# Patient Record
(demographics unavailable — no encounter records)

---

## 2017-10-03 NOTE — NUR
Report given to emt's for katia. VSS. Patient is alert and responsive.  No 
further complaints. IV removed. Catheter intact and site benign. Pressure and 
4x4 applied to site. No bleeding noted.

## 2017-10-03 NOTE — NUR
TO BED 8 A 52 YO FEMALE PT SALONI FROM [facility] FOR TACHYCARDIA 
130. PER FACILITY GAVE 650MG TYLENOL AT 4:30PM TODAY. UPON ARRIVAL, PATIENT IS 
LETHARGIC, NSR ON THE TELE AT 90'S. PLACED ON CARDIAC AND VS MONITORING. WITH 
02 CANNULA AT 2LPM VIA, SATTING AT % AT THIS TIME. KEPT HOB ELEVATED. 
SAFETY AND COMFORT MEASURES RENDERED.

## 2017-12-06 NOTE — NUR
SALONI FROM [facility] DUE TO EPISODE OF SEIZURE. PATIENT RECEIVED 
AWAKE, APPEARS IN NO APPARENT DISTRESS. RESPIRATION EVEN AND UNLABORED. PATIENT 
SATING WELL. PATIENT WITH HX OF SEIZURE. CONNECTED TO TELE MONITOR. NOTED 
TACHY. OTHER VSS

## 2018-01-03 NOTE — NUR
MS RN INITIAL NOTES 



PT IS IN BED AWAKE AND ALERT, ORIENTED TO SELF ONLY. NO SIGNS OF SOB OR DISTRESS, BREATHING 
EVENLY AND UNLABORED. IV ACCESS IS INTACT AND PATENT. BED IS IN LOW AND LOCKED POSITION, 
SIDE RAILS PADDED FOR SEIZURE PRECAUTIONS. WILL CONTINUE TO MONITOR PT

## 2018-01-03 NOTE — NUR
RN NOTE



HELD PROTONIX PT NOT FULLY ALERT AND INCREASE CHANCE OF ASPIRATION. WILL ENDORSE TO AM RN.

## 2018-01-03 NOTE — NUR
RN NOTE



PT REMAINS IN NO ACUTE DISTRESS IN BED. PT DID NOT HAVE ANY SIGNIFICANT CHANGE IN CONDITION 
DURING SHIFT. ALL NEEDS MET, ALL ORDERS CARRIED OUT. WILL ENDORSE CARE TO AM RN FOR CONTINUE 
OF CARE.

## 2018-01-03 NOTE — NUR
RN NOTE



RECEIVED PT IN NO ACUTE DISTRESS IN BED. PT IS OBTUNDED AND NOT AROUSABLE. PT RESPONDS TO 
PAINFUL STIMULI. PT IS ON TELE WITH SR ON THE MONITOR. PT NOT C/O ANY SOB, DIFFICULTY 
BREATHING OR PAIN AT THIS TIME. PT HAS L WRIST 18G THAT IS CLEAN DRY INTACT AND PATENT WITH 
SALINE FLUSH. PT HAS RIGHT AKA. BED IN LOW LOCK POSITION WITH RIALS UP X2 .CALL LIGHT WITHIN 
REACH AND ALL SAFETY MEASURES ENSURED AND CARRIED OUT WILL CONTINUE TO  MONITOR PT.

## 2018-01-03 NOTE — NUR
PT RECEIVED FROM RA FROM SNF C/O SEIZURE LIKE ACTIVITY X1 HOUR. NO SOB NOTED AT 
THIS TIME BUT 02 PLACED FOR PRECAUTION.NO PAIN NOTED. PT IS OBTUNDED UNABLE TO 
MAKE NEEDS KNOWN. WILL CONTINUE CARE

## 2018-01-03 NOTE — NUR
RN CLOSED NOTES



PATIENT IS IN BED, AWAKE. ALERT TO SELF ONLY. NO SIGNS AND SYMPTOMS OF DISTRESS. BREATHING 
IS BILATERALLY EVEN AND UNLABORED. BED IN LOW POSITION, LOCKED AND TWO SIDE RAILS ARE UP. 
CALL LIGHT WITHIN REACH FOR SAFETY. ALL NURSING CARE ANTICIPATED AND ATTENDED FOR. PATIENT 
KEPT CLEAN AND DRY. WILL ENDORSE TO NIGHT SHIFT NURSE

## 2018-01-03 NOTE — NUR
RN NOTE



PT BECAME MORE ALERT AND ABLE TO ANSWER QUESTION AND FOLLOW COMMANDS. WILL CONTINUE TO 
MONITOR PT.

## 2018-01-04 NOTE — NUR
TELE RN CLOSING NOTES 



PT IS IN BED RESTING. NO SIGNS OF SOB OR DISTRESS, BREATHING EVENLY AND UNLABORED. TELE 
MONITOR SHOWING SR 62. SEIZURE PRECAUTIONS MAINTAINED THROUGHOUT THE SHIFT. BED IS IN LOW 
AND LOCKED POSITION. WILL ENDORSE TO DAYSHIFT

## 2018-01-04 NOTE — NUR
MS RN INITIAL NOTES 



PT IS IN BED AWAKE AND ALERT, ORIENTED TO SELF ONLY. NO SIGNS OF SOB OR DISTRESS, BREATHING 
EVENLY AND UNLABORED. IV ACCESS IS INTACT AND PATENT. PT COMPLAINTS OF GENERALIZED PAIN, 
SCHEDULED PAIN MED TO BE GIVEN. BED IS IN LOW AND LOCKED POSITION, SIDE RAILS PADDED FOR 
SEIZURE PRECAUTIONS. WILL CONTINUE TO MONITOR PT.

## 2018-01-04 NOTE — NUR
RN OPEN NOTES



RECEIVED REPORT FROM NIGHT SHIFT RN. PATIENT IS IN BED, ALERT AND ORIENTED TO SELF. NO S/S 
OF DISTRESS. BED IN LOW POSITION, LOCKED AND TWO SIDE RAILS ARE UP. CALL LIGHT WITHIN REACH 
FOR SAFETY. WILL CONTINUE TO ASSESS AND MONITOR PATIENT

## 2018-01-05 NOTE — NUR
RN OPEN NOTES



RECEIVED REPORT FROM NIGHT SHIFT NURSE. PATIENT IS IN BED, ALERT AND ORIENTED TO SELF, 
CONFUSED. NO SIGNS AND SYMPTOMS OF DISTRESS. BED ALARM IS ON FOR SAFETY. BED IN LOW 
POSITION, LOCKED AND TWO SIDE RAILS ARE UP. CALL LIGHT WITHIN REACH. WILL CONTINUE TO ASSESS 
AND MONITOR PATIENT.

## 2018-01-05 NOTE — NUR
MS/RN NOTES



RECEIVED PT. LYING IN BED. AWAKE, ALERT AND ORIENTED TO SELF. BREATHING EVEN AND UNLABORED 
ON 2LPM O2 VIA NC. NO SOB, RESPIRATORY DISTRESS OR COMPLAINTS OF PAIN NOTED AT THIS TIME. 
PT. WITH LEFT WRIST 18 GAUGE IV SALINE LOCK PRESENT, PATENT AND INTACT. SEIZURE PRECAUTIONS 
IMPLEMENTED AND IN PLACE. BED LOCKED AND IN LOWEST POSITION, SIDE RAILS UP X3, BED ALARM ON, 
CALL LIGHT WITHIN REACH, WILL CONTINUE TO MONITOR.

## 2018-01-05 NOTE — NUR
RN CLOSED NOTES



PATIENT IS IN BED, AWAKE AND ALERT TO SELF ONLY. NO SIGNS AND SYMPTOMS OF DISTRESS. 
BREATHING IS BILATERALLY EVEN AND UNLABORED. BED IN LOW POSITION, LOCKED AND TWO SIDE RAILS 
ARE UP. CALL LIGHT WITHIN REACH FOR SAFETY. ALL NURSING CARE ANTICIPATED AND ATTENDED FOR. 
PATIENT KEPT CLEAN AND DRY. TURNED Q2HR. WILL ENDORSE TO NIGHT SHIFT NURSE

## 2018-01-05 NOTE — NUR
MS RN CLOSING NOTES 



PT IS IN BED RESTING. NO SIGNS OF SOB OR DISTRESS, BREATHING EVENLY AND UNLABORED. ISOLATION 
FOR MRSA NARES. SEIZURE PRECAUTIONS MAINTAINED THROUGHOUT THE SHIFT. BED IS IN LOW AND 
LOCKED POSITION. WILL ENDORSE TO DAYSHIFT

## 2018-01-06 NOTE — NUR
RN NOTES

PT IS LAYING DOWN IN BED, RESTING COMFORTABLY. PT ON 2L O2, RESPIRATIONS ARE EVEN AND 
UNLABORED. WAITING FOR AMBULANCE  TO BE TAKEN TO [facility]. SAFETY MEASURES ARE IN PLACE, CALL LIGHT IS IN REACH. WILL ENDORSE TO NIGHT SHIFT RN 
FOR DISCHARGE.

## 2018-01-06 NOTE — NUR
RN OPENING NOTES

RECEIVED PATIENT IN BED, ALERT AND ORIENTED X 2, ABLE TO MAKE NEEDS KNOWN, NOTED WITH NO 
SOB, BREATHING EVEN AND UNLABORED, NOTED WITH NO C/O PAIN, IN NO ACUTE DISTRESS. ALL 
PATIENT'S NEEDS ATTENDED TO. PATIENT IS READY TO BE DISCHARGED, AWAITING FOR PICK-UP. WILL 
CONTINUE TO MONITOR.

## 2018-02-20 NOTE — NUR
PT TYLER MUNGUIA FROM [facility] FOR WITNESSED SEIZURE AT SNF. PT NOTED 
ACTIVELY SEIZING UPON ARRIVAL. ATIVAN GIVEN AS ORDERED. SEIZURE PRECAUTIONS 
IMPLEMENTED. NO ORAL TRAUMA NOTED. PT MAS, NOTED WITH R LASHONDA. IV ACCESS STARTED. 
MD AT BS FOR EVAL. VSS. SAFETY AND COMFORT MEASURES PROVIDED. WILL MONITOR.

## 2018-02-21 NOTE — NUR
RN NOTE:



MILTON JUAREZ WAS MADE AWARE ABOUT THE PATIENT'S ELEVATED HR 'S - 140'S. PATIENT IS 
RESTING COMFORTABLY IN BED AND ASLEEP, BUT AROUSABLE WHEN CALLING HER NAME. NP W/ EKG ORDER, 
NOTED AND CARRIED OUT.

## 2018-02-21 NOTE — NUR
RN opening NOtes:



Received patient on bed awake and alert, on room air, not in apparent distress. Sinus tach 
on monitor HR at 140's to 150's, MD previously aware.  IV access on left hand, patent and 
intact, IVF infusing as ordered. with pain rated as 10/10, "all over my body". pain meds not 
yet due at this time. safety measures ensured. call light in reach. continuously monitored.

## 2018-02-21 NOTE — NUR
RN TELE NOTE:



PATIENT IN BED, HOB ELEVATED. ON O2 2L/MIN VIA NC SATURATING 95%. ON CARDIAC MONITOR ZR=[id number]. 
PATIENT DENIED PAIN. RECEIVED MORPHINE 2MG IV FOR GENERALIZED PAIN, AND WAS RELIEVED BY PAIN 
MEDICATION. 2 SIDE RAILS UP AND PADDED FOR SAFETY. NO SEIZURE EPISODE WITHIN THE SHIFT. 
MILTON JUAREZ WAS AWARE OF THE PATIENT'S HEART RHYTHM OF SINUS TACHYCARDIA PRE AND POST IV 
BOLUS OF 1L NS. (L) HAND IV PERIPHERAL LINE NOTED PATENT AND INTACT. BED ALARM AND LOCKED AT 
ALL TIMES. CALL LIGHT WITHIN REACH. WILL REPORT TO PM SHIFT NURSE FOR CONTINUITY OF CARE.

## 2018-02-21 NOTE — NUR
RN YOSEF NOTE:



INFORMED LARRY MAC NP RE: PATIENT'S NPO STATUS AT THIS TIME. AND PT HAS ANTI-SEIZURE 
MEDICATIONS. PER NP, OK TO ADMINISTER ANTI-SEIZURE MEDICATIONS IF PATIENT IS ALERT. PATIENT 
MADE AWARE.

## 2018-02-21 NOTE — NUR
RN YOSEF NOTE:



RECEIVED PATIENT IN BED, AWAKE, ALERT AND VERBALLY RESPONSIVE. NO SOB NOTED. RESPIRATION 
EVEN AND UNLABORED. NO SEIZURE EPISODE NOTED. HOB ELEVATED. PADDED SIDERAILS FOR SAFETY. ON 
CARDIAC MONITOR ST= 131. ON O2 2L/MIN VIA NC. (L) HAND IV PERIPHERAL LINE NOTED PATENT AND 
INTACT W/ D5HNS @75CC/HR. DENIED PAIN AT THIS TIME. BED AT LOWEST POSITION. BED ALARM AND 
LOCKED AT ALL TIMES. CALL LIGHT WITHIN REACH. NEEDS ANTICIPATED.

## 2018-02-21 NOTE — NUR
RN TELE NOTE:



MILTON JUAREZ WAS MADE AWARE THAT THE PATIENT WAS SEEN BY THE ST TODAY AND HAD SOME 
RECOMMENDATIONS. NP AGREED W/ ORDER, NOTED AND CARRIED OUT. PATIENT MADE AWARE.

## 2018-02-21 NOTE — NUR
RN NOTE:



MILTON JUAREZ WAS INFORMED OF THE PATIENT'S EKG RESULT. NP W/ NO NEW ORDER. PATIENT MADE AWARE.

## 2018-02-22 NOTE — NUR
TELE RN NOTE 

 SEEN BY NEURO PA [facility] FOR DR BRICE AWARE THAT LAST NIGHT AND TILL NOW NO EPISODES OF 
SEIZURE, AWARE THAT PATIENT ON SEIZURE MEDS

## 2018-02-22 NOTE — NUR
MS RN NOTE 

C\O GENERALIZED PAIN IN BODY ,TYLENOL PO GIVEN AS ORDERED , ALL  NEEDS ATTENDED ,WILL CONT 
TO MONITOR CLOSELY

## 2018-02-22 NOTE — NUR
MS RN NOTE

 KEEP CLEAN DRY, CON ON IVF AS ORDERED. NOT IN ACUTE DISTRESS. PADDED SIDE RAILS FOR SEIZURE 
PRAEACUTUS DONE  ,WILL CONT TO MONITOR CLOSELY

## 2018-02-22 NOTE — NUR
RN CLOSING NOTES:

PATIENT REMAINED IN BED NOT IN APPARENT DISTRESS. ALSO REMAINED ON SUPPLEMENTAL O2. SINUS 
TACH ON MONITOR HR 'S. NO COMPLAINTS OF PAIN. MIDLINE INSERTION DONE CARE OF [place] ON FADY. IVF INFUSING AS ORDERED. SKIN CARE RENDERED. SAFETY MEASURES ENSURED. 
CONTINUOUSLY MONITORED. TO ENDORSE TO PM SHIFT RN.

## 2018-02-22 NOTE — NUR
RN NOTES: 

PATIENT'S LEFT IV ACCESS'S SURROUNDING AREA NOTED TO BE SWOLLEN AND PATIENT COMPLAINING OF 
PAIN ON THE SAID AREA AS WELL. PATIENT'S RIGHT ARM VERY CONTRACTED AND LEFT ARM SWOLLEN. 
OBTAINED ORDER FOR MIDLINE INSERTION. BUT AT THIS TIME, MIDLINE NURSE NOT AVAILABLE. 
OBTAINED ORDER OK TO PUT IV ACCESS ON FOOT/ LOWER EXTREMITY. IV PLACED ON LEFT FOOT AS 
TEMPORARY ACCESS.                  ELEVATED LEG. MONITORED FOR ADVERSE CHANGES ON LEFT LEG.

## 2018-02-22 NOTE — NUR
TELE RN NOTE 

 SPOKE WITH LARRY LAND  NP , NOTIFIED   AND ON TELE MONITOR  -108 , ALSO NOTIFIED 
THAT PER MED RECON PATIENT HAD BEFORE METOPROLOL ,STATED THAT WILL CHECK IT, AWARE THAT NO 
SEIZURE ACTIVITY AT THIS TIME, WILL F\U

## 2018-02-22 NOTE — NUR
MS INITIAL RN NOTES:

PATIENT  IN BED NOT IN APPARENT DISTRESS. ALSO REMAINED ON SUPPLEMENTAL O2.  NO COMPLAINTS 
OF PAIN. MIDLINE   ON FADY. IVF INFUSING AS ORDERED. SKIN CARE RENDERED. SAFETY MEASURES 
ENSURED. CONTINUOUSLY MONITORED. ON IVF AS ORDERED , NO SOB NOTED , BED IN LOWEST AND LOCKED 
POSITION , ON 2L NC OF O2 , WILL CONT TO MONITOR CLOSELY

## 2018-02-22 NOTE — NUR
TELE RN NOTES:

PATIENT  IN BED NOT IN APPARENT DISTRESS. ALSO REMAINED ON SUPPLEMENTAL O2. SINUS TACH ON 
MONITOR HR  NO COMPLAINTS OF PAIN. MIDLINE  RN ON FADY. IVF INFUSING AS ORDERED. SKIN 
CARE RENDERED. SAFETY MEASURES ENSURED. CONTINUOUSLY MONITORED. ON IVF AS ORDERED , NO SOB 
NOTED , BED IN LOWEST AND LOCKED POSITION , ON 2L NC OF O2 , WILL CONT TO MONITOR CLOSELY

## 2018-02-23 NOTE — NUR
RN NOTES

PT WAS [facility] IN [facility] CONDITION, ACCOMPANIED 
BY EMT'S. DISCHARGE PAPERS WERE SIGNED AND BELONGINGS WERE RETURNED TO PT. IV ON L FOOT AND 
FADY MIDLINE INTACT TO CONTINUE COURSE OF ANTIBIOTICS. PICTURES WERE TAKEN FOR WOUND 
DOCUMENTATION. PT WAS GIVEN DISCHARGE INSTRUCTIONS ON NEW PRESCRIPTIONS THAT WILL BE FILLED 
AT THE FACILITY. REPORT WAS GIVEN TO CHRISTEN AT [facility].

## 2018-02-23 NOTE — NUR
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 PT IS 
INCONTINENT AND IMMOBILE. SACRAL SCARRING NOTED. ALL SKIN PROTECTION MEASURES IN PLACE AND 
DISCUSSED WITH NURSING STAFF. FIRST STEP MATTRESS ORDERED. WILL SEE SHAUN CHAMPION IN AGREEMENT 
WITH PLAN OF CARE. 

-------------------------------------------------------------------------------

Addendum: 02/23/18 at 1109 by TR SAHU WNDNU

-------------------------------------------------------------------------------

Amended: Links added.

## 2018-02-23 NOTE — NUR
MS CLOSING RN NOTES:

PATIENT  IN BED NOT IN APPARENT DISTRESS. ALSO REMAINED ON SUPPLEMENTAL O2.  NO COMPLAINTS 
OF PAIN. MIDLINE   ON FADY. IVF INFUSING AS ORDERED. SKIN CARE RENDERED. SAFETY MEASURES 
ENSURED. CONTINUOUSLY MONITORED. ON IVF AS ORDERED , NO SOB NOTED , BED IN LOWEST AND LOCKED 
POSITION , ON 2L NC OF O2 , WILL CONT TO MONITOR CLOSELY

## 2018-07-16 NOTE — NUR
TELE ADMIT FROM ER



AFTER REPORT RECEIVED FROM SORIN. PATIENT ORIENTED TO PRIMARY RN, UNIT, ROOM, BED, AND UNIT 
POLICIES REGARDING PATIENT CARE AND VISITING HOURS. PATIENT NOW ON CONTINUOUS TELEMETRY 
MONITORING, READING ON ARRIVAL TO UNIT IS SR 83. PATIENT PLACED ON BEDSIDE OXYGEN, WEIGHED 
BY BEDSCALE AND ENCOURAGED TO CALL IF THEY NEED SOMETHING. ALL QUESTIONS AND CONCERNS 
ADDRESSED, PATIENT UNABLE TO VERBALIZE UNDERSTANDING.

## 2018-07-16 NOTE — NUR
15FR IN AND OUT CATH INSERTED USING STERILE TECH, 100ML STRAW COLOR URINE 
RETURN NOTED. URINE SPECIMEN SENT TO THE LAB.

## 2018-07-16 NOTE — NUR
CHANGE OF SHIFT REPORT



PT RESTING COMFORTABLY IN BED. NO S/S OR C/O PAIN OR DISTRESS NOTED. SIDE RAILS UP X2, CALL 
LIGHT LEFT WITHIN REACH. PT KEPT CLEAN, DRY, AND COMFORTABLE. NO SIGNIFICANT CHANGES SINCE 
ADMISSION. WILL GIVE REPORT TO NOC RN.

## 2018-07-16 NOTE — NUR
[facility], PT GIVEN 
VERSED 5 MG PTA BY PARAMEDICS. NOTED 22G IV TO L HAND PTA BY PARAMEDICS. PT 
PLACED ON CARDIAC MONITOR. VSS/RESP EVEN UNLABORED/NAD NOTED/SKIN WARM AND 
DRY/AFEBRILE/DENIES N-V-D/AOX4. AWAITNG MD OLIVEROS.

## 2018-07-16 NOTE — NUR
RN TELE OPENING NOTES

RECEIVED PATIENT IN BED AWARE. ALERT AND ORIENTED X2. CAN ANSWER BASIC Y/N QUESTIONS. 
BREATHING EVEN AND UNLABORED. NO SOB NOTED. ON 3 LITERS OF OXYGEN VIA NC. NO COMPLAINTS OF 
PAIN OR DISCOMFORT. NO FACIAL GRIMACING. IV ON LEFT HAND #22 INTACT AND PATENT. ALL OTHER 
NEEDS ATTENDED TO. CALL LIGHT WITHIN REACH. BED ON LOWEST LOCKED POSITION. WILL CONTINUE TO 
MONITOR.

## 2018-07-16 NOTE — NUR
TELE ADMIT FROM ER



AFTER REPORT RECEIVED FROM SORIN. PATIENT ORIENTED TO PRIMARY RN, UNIT, ROOM, BED, AND UNIT 
POLICIES REGARDING PATIENT CARE AND VISITING HOURS. PATIENT NOW ON CONTINUOUS TELEMETRY 
MONITORING, READING ON ARRIVAL TO UNIT IS SR 83. PATIENT PLACED ON BEDSIDE OXYGEN, WEIGHED 
BY BEDSCALE AND ENCOURAGED TO CALL IF THEY NEED SOMETHING. ALL QUESTIONS AND CONCERNS 
ADDRESSED, PATIENT UNABLE TO VERBALIZE UNDERSTANDING.

-------------------------------------------------------------------------------

Addendum: 07/16/18 at 1829 by PANTERA HOFFMAN RN

-------------------------------------------------------------------------------

WRONG TIME

## 2018-07-17 NOTE — NUR
RN TELE CLOSING NOTES

PATIENT IN BED AWAKE. ALERT AND ORIENTED X2. CAN ANSWER SIMPLE QUESTIONS. BREATHING EVEN AND 
UNLABORED. NO SOB NOTED. ON 3 LITERS OF OXYGEN VIA NC. NO COMPLAINTS OF PAIN OR DISCOMFORT. 
NO FACIAL GRIMACING.18G FADY MIDLINE PATENT AND INTACT NO REDNESS OR INFILTRATION NOTED - 
CURRENTLY INFUSING NS @ 75ML/HR NO REDNESS OR INFILTRATION NOTED.EPISODE OF SEIZURE X1 
LASTING ONE MINUTE DURING SHIFT, ATIVAN GIVEN PRN. KEPT CLEAN, DRY, AND COMFORTABLE. ALL 
OTHER NEEDS ATTENDED TO. CALL LIGHT WITHIN REACH. BED ON LOWEST LOCKED POSITION.ENDORSED TO 
NEXT SHIFT FOR CONTINUITY OF CARE

## 2018-07-17 NOTE — NUR
RN TELE CLOSING NOTES

PATIENT IN BED AWAKE. ALERT AND ORIENTED X2. CAN ANSWER BASIC Y/N QUESTIONS. BREATHING EVEN 
AND UNLABORED. NO SOB NOTED. ON 3 LITERS OF OXYGEN VIA NC. NO COMPLAINTS OF PAIN OR 
DISCOMFORT. NO FACIAL GRIMACING. IV ON LEFT HAND #22 INTACT AND PATENT - CURRENTLY INFUSING 
NS @ 75ML/HR. NO SEIZURE ACTIVITY THROUGHOUT SHIFT. KEPT CLEAN, DRY, AND COMFORTABLE. ALL 
OTHER NEEDS ATTENDED TO. CALL LIGHT WITHIN REACH. BED ON LOWEST LOCKED POSITION. WILL 
ENDORSE TO ONCOMING NURSE FOR CONTINUITY OF CARE.

## 2018-07-17 NOTE — NUR
RN TELE OPENING NOTES

PATIENT IN BED AWAKE. ALERT AND ORIENTED X2. CAN ANSWER SIMPLE QUESTIONS. BREATHING EVEN AND 
UNLABORED. NO SOB NOTED. ON 3 LITERS OF OXYGEN VIA NC. NO COMPLAINTS OF PAIN OR DISCOMFORT. 
NO FACIAL GRIMACING. IV ON LEFT HAND #22 INTACT AND PATENT - CURRENTLY INFUSING NS @ 75ML/HR 
NO REDNESS OR INFILTRATION NOTED. KEPT CLEAN, DRY, AND COMFORTABLE. ALL OTHER NEEDS ATTENDED 
TO. CALL LIGHT WITHIN REACH. BED ON LOWEST LOCKED POSITION. WILL CONTINUE TO MONITOR

## 2018-07-17 NOTE — NUR
RN TELE OPENING NOTES

PATIENT IN BED AWAKE. ALERT AND ORIENTED X2. CAN ANSWER BASIC Y/N QUESTIONS. BREATHING EVEN 
AND UNLABORED. NO SOB NOTED. ON 3 LITERS OF OXYGEN VIA NC. NO COMPLAINTS OF PAIN OR 
DISCOMFORT. NO FACIAL GRIMACING. MIDLINE ON LEFT UPPER ARM #18 INTACT AND PATENT - CURRENTLY 
INFUSING NS @ 75ML/HR. ALL OTHER NEEDS ATTENDED TO. CALL LIGHT WITHIN REACH. BED ON LOWEST 
LOCKED POSITION. WILL CONTINUE TO MONITOR.

## 2018-07-17 NOTE — NUR
RN TELE NOTES

NYSTATIN NOT ADMINISTERED. PHARMACY DID NOT BRING UP. MEDICATION NOT AVAILABLE IN THE NIGHT 
LOCKER. CHECKED WITH SUPERVISOR. AM NURSE TO FOLLOW-UP.

## 2018-07-18 NOTE — NUR
MS RN CLOSING NOTE

PATIENT IN BED LOCKED IN LOWEST POSITION WITH SIDE RAILS UP X2 FOR SAFETY. ON 3L/MIN OF 
OXYGEN VIA NASAL CANNULA TOLERATING WELL NO SOB OR DISTRESS NOTED. NO PAIN NOTED. ALL DUE 
MEDICATIONS GIVEN AS ORDERED. ALL NURSING CARE NEEDS ATTENDED TO AS NEEDED. CALL LIGHT 
WITHIN REACH AT ALL TIMES. ORIENTED x2 AND ABLE TO COMMUNICATE NEEDS. IV INTACT AND PATENT 
NO REDNESS OR SWELLING NOTED. WITH IV FLUIDS RUNNING AT THIS TIME. ALL ELECTROLYTES REPLACED 
THROUGHOUT SHIFT. ON CONTACT ISOLATION FOR MRSA NARES. LABS IN AM.WILL ENDORSE TO NIGHT 
SHIFT NURSE FOR MIGUEL

## 2018-07-18 NOTE — NUR
MS RN OPENING NOTES 

PATIENT IN BED LOCKED IN LOWEST POSITION WITH SIDE RAILS UP X2 FOR SAFETY. ON 3L/MIN OF 
OXYGEN VIA NASAL CANNULA TOLERATING WELL NO SOB OR DISTRESS NOTED. NO PAIN NOTED. CALL LIGHT 
WITHIN REACH AT ALL TIMES. ORIENTED x2 AND ABLE TO COMMUNICATE NEEDS. IV INTACT AND PATENT 
NO REDNESS OR SWELLING NOTED. WITH IV FLUIDS RUNNING AT 75 ML/H. PT ON CONTACT ISOLATION FOR 
MRSA NARES.WILL CONTINUE TO MONITOR

## 2018-07-18 NOTE — NUR
RN TELE CLOSING NOTES

PATIENT IN BED AWAKE. ALERT AND ORIENTED X2. VERBALLY RESPONSIVE, ABLE TO MAKE NEEDS KNOWN. 
BREATHING EVEN AND UNLABORED. NO SOB NOTED. ON 3 LITERS OF OXYGEN VIA NC. NO COMPLAINTS OF 
PAIN OR DISCOMFORT. NO FACIAL GRIMACING. MIDLINE ON LEFT UPPER ARM #18 INTACT AND PATENT - 
CURRENTLY INFUSING NS @ 75ML/HR. NO SEIZURE ACTIVITY THROUGHOUT SHIFT. KEPT CLEAN, DRY, AND 
COMFORTABLE. ALL OTHER NEEDS ATTENDED TO. CALL LIGHT WITHIN REACH. BED ON LOWEST LOCKED 
POSITION. WILL ENDORSE TO ONCOMING NURSE FOR CONTINUITY OF CARE

## 2018-07-18 NOTE — NUR
TELE RN OPENING NOTE

PATIENT RESTING COMFORTABLY IN BED AT THIS TIME, ALERT AND ORIENTED x2. NO FACIAL GRIMACING 
NOTED FOR PAIN. NO SOB OR DISTRESS NOTED ON 2L/MIN OF OXYGEN VIA NASAL CANNULA AND 
TOLERATING WELL. MIDLINE INTACT AND PATENT, NO REDNESS OR SWELLING NOTED WITH IV FLUIDS 
RUNNING AT THIS TIME TOLERATING WELL. TELE MONITOR-SR70s. WILL CONTINUE TO MONITOR 
THROUGHOUT SHIFT.

## 2018-07-19 NOTE — NUR
rn ms opening notes

received patient in bed, awake alert and oriented x 2, verbally responsive, respirations 
even and unlabored, on 02 3L via nc ,respirations even and unlabored with equal rise and 
fall of chest. denies any pain or discomfort at this time. left upper midline intact and 
patent, no redness no infiltration present, patient denies discomfort to site, oriented to 
staff and call light, call light kept within reach, safety precautions rendered, remains 
comfortable at this time, will continue to monitor.

## 2018-07-19 NOTE — NUR
MS RN CLOSING NOTES 

PATIENT IN BED LOCKED IN LOWEST POSITION WITH SIDE RAILS UP X2 FOR SAFETY. ON 3L/MIN OF 
OXYGEN VIA NASAL CANNULA TOLERATING WELL NO SOB OR DISTRESS NOTED. NO PAIN NOTED. CALL LIGHT 
WITHIN REACH AT ALL TIMES. ORIENTED x2 AND ABLE TO COMMUNICATE NEEDS. IV INTACT AND PATENT 
NO REDNESS OR SWELLING NOTED. WITH IV FLUIDS RUNNING AT 75 ML/H. PT ON CONTACT ISOLATION FOR 
MRSA NARES.SEIZURE PRECAUTIONS IN PLACE. WILL CONTINUE TO MONITOR.

## 2018-07-19 NOTE — NUR
RN NOTES CLOSING 



 PATIENT RESTING IN BED. NONLABORED BREATHING NOTED ON 3 L NASAL CANNULA. NO FACIAL 
GRIMACING NOTED. FADY MIDLINE PATENT AND INTACT. 

WOUND CARE DONE THROUGHOUT SHIFT. PATIENT TURNED AND REPOSITIONED EVERY 2 HOURS.  

ASPIRATION AND SEIZURE PRECAUTIONS IMPLEMENTED THROUGHOUT SHIFT. NO SEIZURES NOTED 
THROUGHOUT SHIFT. VISUAL CHECKS Q 15 MINS DONE BY MYSELF AND CNA.

## 2018-07-20 NOTE — NUR
RN MS CLOSING NOTES

PATIENT IN BED AWAKE ALERT AND ORIENTED X 2, NOTED CONFUSED AND FORGETFUL AT TIMES , ABLE TO 
FOLLOW SIMPLE COMMANDS,VERBALLY RESPONSIVE, RESPIRATIONS EVEN AND UNLABORED WITH EQUAL RISE 
AND FALL OF CHEST ON 02 3L VIA NC, DENIES ANY PAIN OR DISCOMFORT AT THIS TIME, WOUND CARE TO 
BILATERAL BREAST PROVIDED AS ORDERED, LEFT UPPER ARM MIDLINE INTACT AND PATENT, NO REDNESS , 
NO INFILTRATION PRESENT, SEIZURE PRECAUTIONS RENDERED, SIDE RAILS PADDED, HEAD OF BED 
ELEVATED FOR ASPIRATIONS PRECAUTIONS, ALL NEEDS ATTENDED, REMAINS COMFORTABLE ,REPOSITIONED, 
CALL LIGHT KEPT WITHIN REACH, WILL CONTINUE TO MONITOR AND ENDORSE TO NEXT SHIFT. NO SEIZURE 
ACTIVITY THROUGHOUT SHIFT, NOTED TO  VOID 3 TIMES LARGE AMOUNTS DURING SHIFT.

## 2018-07-20 NOTE — NUR
RN MS DISCHARGE NOTES

 PATIENT READY FOR DISCHARGE , RESPIRATIONS EVEN AND UNLABORED, DENIES ANY PAIN OR 
DISCOMFORT, VITAL SIGNS WITHIN NORMAL LIMITS BLOOD PRESSURE STABLE, LEFT UPPER ARM MIDLINE, 
REMOVED AND NOTED COMPLETELY INTACT, NO BLEEDING TO SITE, DENIES PAIN OR DISCOMFORT , 
PATIENT LEFT WITH BELONGINGS AS STATED ON ADMISSION, REPORT GIVEN TO EMT, DISCHARGE PAPER 
WORK GIVEN TO EMT, PATIENT LEFT IN STABLE CONDITION.

## 2018-07-20 NOTE — NUR
MS RN CLOSING NOTES



PT REMAINS STABLE. ALL NEEDS WERE MET DURING SHIFT AND ORDERS CARRIED OUT ACCORDINGLY. ALL 
DUE MEDS GIVEN. WOUND AND SKIN CARE RENDERED. OT REPOSITIONED AND TURNED PER HOSPITAL 
PROTOCOL. PT SCHEDULED TO BE DISCHARGED TODAY AT 8PM. D/C PAPER WORK AND COPIED COMPLETED. 
REPORT CALLED AND GIVEN TO RODOLFO AT [facility]. D/C PHOTO TAKEN AND PLACED IN 
PT'S CHART. SAFETY AND SEIZURE PRECAUTIONS REMAIN IN PLACE. WILL ENDORSE TO NIGHTSHIFT NURSE 
TO D/C IV, CARRY OUT D/C AND CONTINUE TO MONITOR

## 2018-07-20 NOTE — NUR
RN MS OPENING NOTES

RECEIVED PATIENT IN BED, AWAKE ALERT AND ORIENTED X2, RESPIRATIONS EVEN AND UNLABORED WITH 
EQUAL RISE AND FALL OF CHEST ON 3L VIA NC, DENIES ANY PAIN OR DISCOMFORT AT THIS TIME, LEFT 
UPPER ARM MIDLINE INTACT AND PATENT, NO REDNESS, NO INFILTRATION PRESENT, IVF RUNNING AS 
ORDERED,ORIENTED TO STAFF AND CALL LIGHT, CALL LIGHT KEPT WITHIN REACH, ALL NEEDS ATTENDED 
AT THIS TIME PATIENT WAS CLEANED NOTED HAD LARGE VOID, URINE YELLOW, REMAINS COMFORTABLE AT 
THIS TIME, AWAITING DISCHARGE.

## 2018-07-20 NOTE — NUR
MS RN NOTES: PHENOBARBITAL ADMINISTRATION 



PT'S CURRENT PHENOBARBITAL LEVEL IS 27 AS OF 7/18/18. PHARMACIST CARLEE MADE AWARE AND GAVE 
THE APPROVAL TO GIVE 0900 AM DOSE AS "THE INDICATION FOR A NEW LAB DRAW WOULD BE UP TO THE 
DISCRETION OF THE NEUROLOGIST."

## 2018-07-20 NOTE — NUR
MS RN OPENING NOTES



RECEIVED PT FROM NIGHTSHIFT NURSE IN STABLE CONDITION. PT IS A/O X2. NO SOB OR SIGNS OF 
DISTRESS NOTED. BREATHING IS EVEN AND UNLABORED. PT ON 3L VIA NC AND SATING WELL. NO SEIZURE 
ACTIVITY WITNESSED AND/OR REPORTED BY NIGHTSHIFT NURSE. SEIZURE PRECAUTIONS IN PLACE. PT 
DENIES ANY PAIN AT THIS TIME. LEFT UPPER ARM MIDLINE NOTED TO BE PATENT AND INTACT. NO 
REDNESS OR SIGNS OF INFILTRATION NOTED. BED IN LOW LOCKED POSITION, SIDE TRAILS UP X3, CALL 
LIGHT WITHIN REACH, BED ALARM ON. WILL CONTINUE TO MONITOR

## 2019-02-27 NOTE — NUR
TELE RN OPENING NOTES

RECEIVED REPORT FROM [facility]. PATIENT A/A/O X2 & NOTED W/ SOME LETHARGY BUT ABLE TO MAKE 
NEEDS KNOWN & STATE PAIN. BREATHING EVEN & UNLABORED, TOLERATING O2 @ 2LPM. ON TELE W/ SINUS 
RHYTHM, HR 60S. LEFT WRIST IV #20 INTACT & PATENT W/ DRESSING CDI & IVF NS INFUSING WELL @ 
75 ML/HR. DENIES ANY PAIN OR DISCOMFORT @ THIS TIME. SAFETY MEASURES IN PLACE W/ SIDE RAILS 
UP & BED ALARM ON. CALL LIGHT WITHIN REACH. SEIZURE PRECAUTIONS IN PLACE. WILL CONTINUE TO 
MONITOR.

## 2019-02-27 NOTE — NUR
RN NOTE:



DR. OCASIO WAS INFORMED THAT THE PATIENT HAS BEEN DOING WELL IN THE UNIT. NO SEIZURE 
EPISODE NOTED SINCE ADMISSION TO THE UNIT. MD WAS AWARE THAT THE NURSING SWALLOW EVALUATION 
WAS DONE AND THE PATIENT PASSED IT. DUE TO PATIENT'S RECENT SEIZURE EPISODE, MD WAS INFORMED 
IF PATIENT CAN HAVE PUREED DIET FOR NOW AND WILL ADVANCED IT AS TOLERATED. MD AGREED, WITH 
NEW ORDER, NOTED AND CARRIED OUT. PATIENT MADE AWARE.

## 2019-02-27 NOTE — NUR
RN CLOSING NOTES

REPORT GIVEN TO NIGHT SHIFT RN. PT REFUSED AFTERNOON MEDS. PT IS A&OX3. NO SIGNS OF 
RESPIRATORY PROBLEMS NOTED. PT IS RESTING IN BED. ENDORSED T0 NIGHT SHIFT RN.

## 2019-02-27 NOTE — NUR
RN ADMISSION NOTE



RECEIVED PT FROM ER. NO RESPIRATORY DISTRESSED NOTED. PT DENIES ANY MERON SENSATION. PT 
STATES THAT SHE IS VERY TIRED AND WOULD LIKE TO SLEEP. PT IS A&OX3. LEFT HAND 20 GAUGE 
SALINE LOCKED. BED IS LOCKED AND IN LOWEST POSITION. PT PLACED IN POSITION OF COMFORT.

## 2019-02-27 NOTE — NUR
PT BIBRA FROM SNF D/T SEIZURE, PT STILL ACTIVELY SEIZING, VERSED WAS GIVEN PTA; 
GIVEN ATIVAN IVP. PT ON MONITOR, MD MAMTA AT BEDSIDE FOR EVAL

## 2019-02-28 NOTE — NUR
TELE RN NOTE

SEEN BY  ,UPDATED ABOUT PATIENT CONDITION.SEEN BY MILTON RICE UPDATED ABOUT 
PATIENT CONDITION.

## 2019-02-28 NOTE — NUR
TELE RN CLOSING NOTES

 PATIENT A/A/O X2 ABLE TO MAKE NEEDS KNOWN & STATE PAIN. BREATHING EVEN & UNLABORED, 
TOLERATING O2 @ 2LPM. ON TELE W/ SINUS RHYTHM, HR76. R INDEX FINGER INTACT & PATENT W/ 
DRESSING CDI & IVF NS INFUSING WELL @ 75 ML/HR. DENIES ANY PAIN OR DISCOMFORT @ THIS TIME. 
SAFETY MEASURES IN PLACE W/ SIDE RAILS UP & BED ALARM ON. CALL LIGHT WITHIN REACH. SEIZURE 
PRECAUTIONS IN PLACE. WILL ENDORSE TO PM NURSE FOR MIGUEL.

## 2019-02-28 NOTE — NUR
TELE RN OPENING NOTES

RECEIVED REPORT FROM PM RN. PATIENT A/A/O X2 & NOTED W/ SOME LETHARGY BUT ABLE TO MAKE NEEDS 
KNOWN & STATE PAIN. BREATHING EVEN & UNLABORED, TOLERATING O2 @ 2LPM. ON TELE W/ SINUS 
RHYTHM, HR 80S. LEFT WRIST IV #20 INTACT & PATENT W/ DRESSING CDI & IVF NS INFUSING WELL @ 
75 ML/HR.  SAFETY MEASURES IN PLACE W/ SIDE RAILS UP & BED ALARM ON. CALL LIGHT WITHIN 
REACH. SEIZURE PRECAUTIONS IN PLACE.HOB ELEVATED. WILL CONTINUE TO MONITOR.

## 2019-02-28 NOTE — NUR
TELE RN OPENING NOTES

RECEIVED REPORT FROM [facility]. PATIENT A/A/O X3 & NOTED W/ SOME LETHARGY BUT ABLE TO MAKE 
NEEDS KNOWN & STATE PAIN. BREATHING EVEN & UNLABORED, TOLERATING O2 @ 2LPM. ON TELE W/ SINUS 
RHYTHM, HR 60S. RIGHT INDEX FINGER IV #22 INTACT & PATENT W/ DRESSING CDI & IVF NS INFUSING 
WELL @ 75 ML/HR. DENIES ANY PAIN OR DISCOMFORT @ THIS TIME. SAFETY MEASURES IN PLACE W/ 
PADDED SIDE RAILS UP & BED ALARM ON. CALL LIGHT WITHIN REACH. SEIZURE PRECAUTIONS IN PLACE. 
WILL CONTINUE TO MONITOR.

## 2019-03-01 NOTE — NUR
TELE RN NOTE:



RECEIVED PT ON BED ALERT AND ORIENTED X2, VERBALLY RESPONSIVE. NO APPARENT DISTRESS NOTED. 
NO COMPLAINTS OF PAIN OR DISCOMFORT AT THIS TIME. ON 2LPM NASAL CANNULA, NO SOB NOTED. SINUS 
RHYTHM ON TELE MONITOR HR 72BPM. KEPT CLEAN, DRY AND COMFORTABLE. CALL LIGHT PLACED WITHIN 
REACH. SAFETY AND FALL PRECAUTIONS OBSERVED AND MAINTAINED. WILL CONTINUE TO MONITOR PT.

## 2019-03-01 NOTE — NUR
TELE RN CLOSING NOTES

PATIENT A/A/O X3 & NOTED W/ SOME LETHARGY BUT ABLE TO MAKE NEEDS KNOWN & STATE PAIN. 
BREATHING EVEN & UNLABORED, TOLERATING O2 VIA NC. ON TELE W/ SINUS RHYTHM, HR 70S. RIGHT 
INDEX FINGER IV #22 INTACT & PATENT W/ DRESSING CDI & IVF NS INFUSING WELL AT 75 ML/HR. 
DENIES ANY PAIN OR DISCOMFORT AT THIS TIME. SAFETY MEASURES IN PLACE W/ PADDED SIDE RAILS UP 
& BED ALARM ON. CALL LIGHT WITHIN REACH. SEIZURE PRECAUTIONS IN PLACE. NO SEIZURE ACTIVITY 
THROUGHOUT SHIFT. ENDORSED TO NIGHT SHIFT NURSE FOR MIGUEL.

## 2019-03-01 NOTE — NUR
TELE RN OPENING NOTES

RECEIVED REPORT FROM [facility] SHIFT RN. PATIENT A/A/O X3 & NOTED W/ SOME LETHARGY BUT ABLE TO 
MAKE NEEDS KNOWN & STATE PAIN. BREATHING EVEN & UNLABORED, TOLERATING O2 VIA NC. ON TELE W/ 
SINUS RHYTHM, HR 65S. RIGHT INDEX FINGER IV #22 INTACT & PATENT W/ DRESSING CDI & IVF NS 
INFUSING WELL AT 75 ML/HR. DENIES ANY PAIN OR DISCOMFORT AT THIS TIME. SAFETY MEASURES IN 
PLACE W/ PADDED SIDE RAILS UP & BED ALARM ON. CALL LIGHT WITHIN REACH. SEIZURE PRECAUTIONS 
IN PLACE. WILL CONTINUE TO MONITOR.

## 2019-03-02 NOTE — NUR
TELE RN NOTE:



NO CHANGES NOTED THROUGHOUT THE SHIFT. NO APPARENT DISTRESS NOTED. DENIES PAIN AND 
DISCOMFORT AT THIS TIME. SINUS RHYTHM ON TELE MONITOR HR 82BPM. IV ON RIGHT INDEX FINGER 
INTACT AND PATENT, IVF INFUSING WELL. KEPT CLEAN, DRY AND COMFORTABLE. SAFETY AND FALL 
PRECAUTIONS OBSERVED AND MAINTAINED. WILL ENDORSE TO DAY SHIFT RN FOR CONTINUITY OF CARE.

## 2019-03-02 NOTE — NUR
YOSEF RN NOTE:



RECEIVED PT ON BED ALERT AND ORIENTED X3, VERBALLY RESPONSIVE. NO APPARENT DISTRESS NOTED AT 
THIS TIME. NO COMPLAINTS OF PAIN OR DISCOMFORT AT THIS TIME. ON 2LPM O2 VIA  NASAL CANNULA, 
NO SOB NOTED. SINUS RHYTHM ON TELE MONITOR HR 70'S . KEPT CLEAN, DRY AND COMFORTABLE. CALL 
LIGHT PLACED WITHIN REACH. SAFETY AND FALL PRECAUTIONS OBSERVED AND MAINTAINED. WILL 
CONTINUE TO MONITOR PT.

## 2019-03-03 NOTE — NUR
TELE RN OPENING NOTES



RECEIVED PT ON BED ALERT AND ORIENTED X3, VERBALLY RESPONSIVE. NO APPARENT DISTRESS NOTED AT 
THIS TIME. NO COMPLAINTS OF PAIN OR DISCOMFORT AT THIS TIME. ON 2L O2 VIA NASAL CANNULA, NO 
SOB NOTED. IV SITE R INDEX FINGER #22, NORMAL SALINE RUNNING AT 75ML/HR. SINUS RHYTHM ON 
TELE MONITOR HR 70'S. CALL LIGHT WITHIN REACH. SAFETY AND FALL PRECAUTIONS OBSERVED AND 
MAINTAINED. WILL CONTINUE TO MONITOR THROUGHOUT SHIFT.

## 2020-02-20 NOTE — NUR
[place] Assessments completed by mom and GM jointly, and Vilmanesherry's teacher. Family results were c/w dx of ADHD, combined-type, and ODD  Teacher's results were c/w ADHD, primarily hyp./imp.-type, plus ODD, and anxiety / depression. She was given a referral last week to for an eval with Peds Psychology as well for a more in-depth assessment. RN NOTES

PT IS LAYING IN BED SLEEPING, NO SIGNS OF DISTRESS NOTED. PT ON 2L O2, RESPIRATIONS ARE EVEN 
AND UNLABORED. IV ON L WRIST INTACT AND SL. SAFETY MEASURES ARE IN PLACE, CALL LIGHT IS IN 
REACH. WILL CONTINUE TO MONITOR.

## 2020-03-01 NOTE — NUR
FOLLOWED UP PHENOBARBITAL FROM PHARMACY. PER PHARMACY ([facility]) IT TAKES A WHILE TO 
MAKE MEDICATION. MADE MD AWARE

## 2020-03-01 NOTE — NUR
RN ICU

PT AROUSEABLE NOW BUT WEAK. NODS YES OR NO TO QUESTIONS.  REORIENTATION DONE.  RIGHT ARM 
CONTRACTED.  RLE AKA.  IVF STARTED.  DR CHURCHILL IN TO SEE PT.

## 2020-03-01 NOTE — NUR
RN ICU

RECEIVED PT FROM ER BY GRZEGORZ WITH MONITOR. PT ADMITTED TO ICU FOR SEIZURES.  CURRENTLY, NO 
SEIZURES SEEN.   PT MINIMALLY AROUSEABLE.  ABLE TO MOVE LEFT HAND.  DOES NOT OPEN EYES 
SPONTANEOUSLY.  PT HAS ONE 20 GA IV LINE IN LEFT HAND.  ATTEMPT TO PLACE PERIPHERAL LINE 
UNSUCCESSFUL.  WILL ASK FOR MIDLINE PLACEMENT.  O2 AT 2L NC WITH SPO2 100%.  AWAITING BLOOD 
GAS.  F/C IN PLACE DRAINING CLEAR URINE.

## 2020-03-01 NOTE — NUR
SALONI FROM [facility] 'ON ACTIVE SEIZURE DURING ARRIVAL IN 
ED, MOSTLY R FACIAL TWITCHING, VERSED 5MG GIVEN INTRA-NASAL. WX=[id number]". TO ER BED 
8, HOOKED TO CARDIAC MONITOR, SEIZURE PRECAUTIONS APPLIED, PLACED ON 
NON-REBREATHER MASK AT 15LPM O2, DR ANTOINE AT BEDSIDE

## 2020-03-01 NOTE — NUR
ICU RN OPENING NOTES, 



RECEIVED PATIENT IN BED SLEEPING COMFORTABLY.  PATIENT MINIMALLY AROUSABLE. ABLE TO MOVE 
LEFT HAND. DOES NOT OPEN EYES SPONTANEOUSLY.  CURRENTLY, NO SEIZURE ACTIVITY IS PRESENT. 
PATIENT HAS IV #20 GA ON LEFT HAND NS @100,L/HR.   MIDLINE PLACEMENT IS SCHEDULED AT 2100.  
PATIENT ON RA TOLERATING WELL. NO SOB OR ACUTE DISTRESS NOTED AT THIS TIME 97%. F/C IN PLACE 
DRAINING CLEAR YELLOW URINE TO THE GRAVITY. ALL SAFETY MEASURES IN PLACE, BED IN LOW LOCKED 
POSITION, CALL LIGHT WITHIN REACH. WILL CONTINUE TO MONITOR.

## 2020-03-02 NOTE — NUR
RN NOTES



REPORT GIVEN TO LEONELRN FOR CONTINUITY OF CARE. 





1310: TRANSFERRED PATIENT TO ROOM 117-3 VIA ACLS PROTOCOL. HANDS OFF

## 2020-03-02 NOTE — NUR
RN NOTES



PAGED DR. OCASIO TO OBTAIN ORDER FOR SWALLOW EVAL SINCE PATIENT PASSED NURSING TREAT. MD 
PERMITTED. ORDER NOTED AND CARRIED OUT

## 2020-03-02 NOTE — NUR
RN OPENING NOTE

RECEIVED PATIENT IN BED, A/O X 2. VERBALLY RESPONSIVE. DENIES ANY PAIN OR DISCOMFORT AT THE 
MOMENT. ON ROOM AIR, SATURATING WELL @ 98 WITHOUT SIGNS OF DISTRESS. IV ACCESS ON L UA 
MIDLINE AND L HAND #20G. BOTH INTACT, PATENT AND FLUSHED WELL. NO SIGNS OF INFILTRATION. 
PRESCOTT CATHETER PATENT AND IN PLACE DRAINING CLEAR YELLOW URINE VIA GRAVITY. ON TELE MONITOR, 
SR HR 80 . SAFETY MEASURES IN PLACE, SEIZURE PRECAUTIONS IMPLEMENTED, CALL LIGHT WITHIN 
REACH. WILL CONTINUE TO MONITOR.

## 2020-03-02 NOTE — NUR
RN OPENING NOTES



RECEIVED PATIENT FROM ICU, A/O X 2. VERBALLY RESPONSIVE. DENIES ANY PAIN OR DISCOMFORT AT 
THE MOMENT. ON ROOM AIR, SATURATING WELL @ 98%. IV ACCESS ON L UA MIDLINE AND L HAND #20G. 
BOTH INTACT, PATENT AND FLUSHED WELL. NO SIGNS OF INFILTRATION. PRESCOTT CATHETER , INTACT, 
DRAINING COLOR YELLOW URINE 10 ML ON THE BAG. ON TELE MONITOR, SR HR 81 . SAFETY MEASURE IN 
PLACE, CALL LIGHT WITHIN REACH. WILL CONTINUE TO MONITOR.

## 2020-03-02 NOTE — NUR
RN NOTES



RECEIVED PATIENT, AWAKE, ALERT AND ORIENTED x1-2, ABLE TO RESPOND APPROPRIATELY, BREATHING 
UNLABORED, SATING WELL ON RA, SINUS RHYTHM ON THE MONITOR WITH HR ON THE 80s. NO INDICATION 
OF PAIN NOTED,NO COMPLAINTS OF PAIN OF ANY KIND. MIDLINE ON THE FADY, IN PLACE AND PATENT ON 
FLUSHING. DRESSING D/C/I. PRESCOTT CATHETER IN PLACE AND DRAINING WELL VIA GRAVITY.  PATIENT 
ENCOURAGE TO CALL FOR HELP OR ASSISTANCE, TO VERBALIZE FEELINGS AND CONCERNS.  CALL LIGHT 
PLACED WITHIN REACH. SAFETY AND SEIZURE PRECAUTIONS AND MEASURES OBSERVED AND MAINTAINED. 
WILL CONTINUE TO MONITOR PATIENT ACCORDINGLY

## 2020-03-02 NOTE — NUR
RN CLOSING NOTES





PATIENT IN BED, IN NO APPARENT DISTRESS NOTED. ON ROOM AIR SATURATING WELL. KEPT CLEAN AND 
DRY.  ALL NEEDS MET. SAFETY MEASURES APPLIED, CALL LIGHT WITHIN REACH.  ENDORSED TO PM RN 
FOR MIGUEL.

## 2020-03-02 NOTE — NUR
ICU RN CLOSING NOTES, 



PATIENT IN BED SLEEPING COMFORTABLY.  PATIENT IS AROUSABLE. ABLE TO MOVE LEFT HAND. A/O X2. 
NO SEIZURE ACTIVITY OCCURRED OVER NIGHT.  PATIENT HAS IV #20 GA ON LEFT HAND NS @100,L/HR 
ALSO  FADY MIDLINE,   PATIENT NO S/S OF INFILTRATION NOTED.  ON RA TOLERATING WELL. NO SOB OR 
ACUTE DISTRESS NOTED AT THIS TIME 97%. F/C IN PLACE DRAINING CLEAR YELLOW URINE TO THE 
GRAVITY. ALL SAFETY MEASURES IN PLACE, BED IN LOW LOCKED POSITION, CALL LIGHT WITHIN REACH. 
ENDORSED THE PATIENT TO AM RN FOR MIGUEL.

## 2020-03-03 NOTE — NUR
TELE/RN CLOSING NOTES



PATIENT CONTINUES TO REMAIN IN STABLE CONDITION THROUGHOUT THE SHIFT. PROVIDED COMFORT AND 
SAFETY. PATIENT ABLE TO TOLERATE MEALS AND MEDS WELL. IV ACCESS INTACT AND PATENT. FLUSHING 
WELL. ALL NEEDS ANTICIPATED. CALL LIGHT WITHIN REACHED. BED LOCKED AND IN LOWEST POSITION. 
SAFETY MAINTAINED. WILL CONTINUE TO MONITOR CLOSELY. ENDORSED TO PM NURSE FOR MIGUEL.

## 2020-03-03 NOTE — NUR
RN CLOSING NOTE

PATIENT IN BED, A/O X 2. AWAKE AND VERBALLY RESPONSIVE. DENIES ANY PAIN OR DISCOMFORT. ON 
ROOM AIR WITHOUT SIGNS OF DISTRESS. IV ACCESS ON L UA MIDLINE AND L HAND #20G. BOTH INTACT, 
OF INFILTRATION. PRESCOTT CATHETER PATENT AND IN PLACE DRAINING CLEAR YELLOW URINE VIA GRAVITY. 
ON TELE MONITOR, SR HR 80 . SAFETY MEASURES IN PLACE, SEIZURE PRECAUTIONS IMPLEMENTED, CALL 
LIGHT WITHIN REACH. ENDORSED TO MORNING SHIFT.

## 2020-03-03 NOTE — NUR
MS RN NOTE

PT IN BED AWAKE. A/O X 2/3. NO SOB, NO DISTRESS OR DISCOMFORT NOTED. DENIES PAIN. FADY 
MIDLINE INTACT INFUSING NS @ 100 ML/HR, NO S/S OF INFILTRATION NOTED. F/C INTACT AND PATENT 
DRAINING YELLOWISH COLOR URINE. SIDE RAILS UP X 3 AND CALL LIGHT WITHIN REACH. VSS. CONTINUE 
TO MONITOR HER.

## 2020-03-04 NOTE — NUR
MS/RN DISCHARGE NOTE 



Patient is medically stable for discharge. MD aware. Patient is  A/O x3 showing no signs of 
acute distress or SOB, saturating >95% on RA. Vital signs: /83, , T 98.2F, RR 
18, O2 99% on RA. IV line in left hand and FADY removed. All patient needs met, all due meds 
given. Patient turned and repositioned q2hrs. DC instructions provided and patient 
verbalized understanding. Patient unable to sign due to being contracted in both upper 
extremities, myself and witness RN signed. Skin remains intact, right AKA noted. Avery 
catheter noted and will keep avery in. Charge nurse aware, and RN Steffanie at [facility] 
aware. Report given to Steffanie at [facility]. Patient hospital by [facility] via ambulance.

## 2020-03-04 NOTE — NUR
MS RN NOTE

PT IN BED AWAKE. NO DISTRESS OR DISCOMFORT NOTED. DENIES PAIN. IVF INFUSING WELL, NO S/S OF 
INFILTRATION NOTED. SIDE RAILS UP X 2 AND CALL LIGHT WITHIN REACH. WILL ENDORSE TO DAY SHIFT 
NURSE FOR CONTINUE TO CARE.

## 2020-03-04 NOTE — NUR
MS/RN ENDORSEMENT RECEIVED 



Received report from [facility]. Patient remains stable, showing no signs of acute distress, 
A/O x3, vitals signs WNL, saturating 97% on RA. IV line in left hand remains clean and 
intact, FADY midline is clean and intact running NS @100ml/hr. Villalpando catheter noted with 
500cc output, clear and yellow. Bed is in lowest position, side rails x3 in upright 
position, call light is within reach and patient is aware of how to call for assistance when 
needed.

## 2020-05-13 NOTE — NUR
TELE RN ADMISSION NOTES

RECEIVED FROM ER VIA ACLS PROTOCOL,VIA GRZEGORZ THIS 55 YO OLD FEMALE,A RESIDENT OF [facility],WITH CHIEF COMPLAINTS OF SEIZURE,ALERT,ORIENTED X1,MOANS,WITH KNOWN HX 
OF RIGHT AKA,NOTED SMALL WOUND ON THE STUMP,NON DRAINING,CLEAN,NO FOUL SMELL,OPEN TO 
AIR.LEFT FOOT DROP NOTED.ACCORDING TO FACILITY,PATIENT WAS TESTED OF COVID-19 AND ITS 
NEGATIVE.VITAL SIGNS WITH IN NORMAL LIMITS,NO FEVER.ALSO ACCORDING TO STAFF,PATIENT ON 
PUREED DIET AND SHES NEGATIVE FOR ASPIRATION.WILL CONTINUE TO MONITOR STATUS.

## 2020-05-13 NOTE — NUR
IV LINE ESTABLISHED ON THE RIGHT FOREARM 20G WITH 1MG OF ATIVAN GIVEN VIA IV 
PER MD'S ORDER. PHLEBOTOMIST AT BEDSIDE AND BLOOD COLLECTED AND SENT TO THE 
LAB.

## 2020-05-13 NOTE — NUR
TELE RN NOTES

STARTED ON IVF D5 1/2NS AT 75ML/HR RATE,INFUSING ON RIGHT FOREARM SALINE LOCK VIA IV PUMP.

## 2020-05-14 NOTE — NUR
TELE RN NOTES





CALLED PHARMACY AND SPOKE TO [facility] REGARDING IV KEPPRA. INFORMED HOSPITALIST/CN AS WELL AND 
NEEDED VERIFICATION FOR MED DOSAGE. AWAITING FOR RESPONSE.

## 2020-05-14 NOTE — NUR
TELE RN NOTES



PT AWAKE, ABLE TO COMMUNICATE. A/O X2-3. PT INFORMED RN SHE TAKES MEDICINES CRUSHED AND WITH 
APPLESAUCE. HOSPITALIST/NP/CN MADE AWARE, AWAITING FOR DIET/FURTHER ORDERS. PT DENIES SHE'S 
HUNGRY AND PREFERS TO RESTA T THIS TIME. WILL CONTINUE TO MONITOR.

## 2020-05-14 NOTE — NUR
TELE RN NOTES

DUE PO MEDS GIVEN GIVEN WITH APPLE SAUCE,TAKEN WELL.NEGATIVE FOR ASPIRATION,HOB ELEVATED.

## 2020-05-14 NOTE — NUR
TELE RN NOTES

CALM AND QUIET ON BED.SLEPT WELL.NO EPISODE OF SEIZURE NOTED.REPOSITION PER PROTOCOL.IN NO 
ACUTE DISTRESS.CALL LIGHT IN REACH,NEEDS ATTENDED.

## 2020-05-14 NOTE — NUR
MS RN NOTES

RECEIVED ON BED,A/O X3,BREATHING EASY NO SOB,MORE ALERT,ABLE TO VERBALIZED NEEDS.WITH 
PERIODS OF CONFUSION.PRESENT IVF INFUSING WELL ON RIGHT FOREARM,SITE PATENT.NEEDS FURTHER 
REORIENTATION,PATIENT CLAIMED SHE DOESNT KNOW ALL ALONG THAT SHE HAD PREVIOUS RIGHT ABOVE 
THE KNEE AMPUTATION.CALL LIGHT IN REACH,NEEDS ANTICIPATED.

## 2020-05-14 NOTE — NUR
TELE RN OPENING NOTES



RECEIVED PT IN BED, ASLEEP, EASILY AROUSED, APPEARS DROWSY. PT TOLERATING RA, WITH NO ACUTE 
RESPIRATORY DISTRESS NOTED. PT NOT EXHIBITING PAIN OR ANY DISCOMFORT AT THIS TIME. PT ON 
TELEMONITORING WITH SR 77. IVF D5 1/2 NS AT 75ML/HR TO RFA G20, INTACT AND FLUID INFUSING 
WELL. PT KEPT COMFORTABLE. SEIZURE PRECAUTIONS OBSERVED, PADDED RAILS. CALL LIGHT KEPT 
WITHIN REACH. PT'S BED IIN LOWEST, LOCKED, POSITION WITH SR X4. WILL CONTINUE PLAN OF CARE.

## 2020-05-14 NOTE — NUR
TELE RN NOTES



PT SEEN AND EVALUATED BY /NEURO THROUGH FACETIME, NO NEW ORDERS AT THIS TIME. AND AT 
THE SAME TIME HOSPITALIST/NP/CN IN THE ROOM AS WELL, SEEN AND EVALUATED.PT. DIET RESUMED, 
PUREE. WILL CONTINUE TO MONITOR.

## 2020-05-14 NOTE — NUR
TELE RN CLOSING NOTES



PT REMIANS IN BED, INTERMITTENTLY DOZING OFF, A/OX3. PT TOLERATING RA, WITH NO ACUTE 
RESPIRATORY DISTRESS NOTED. PT DENIES PAIN OR ANY DISCOMFORT AT THIS TIME. PT ON 
TELEMONITORING WITH SR 78. IVF D5 1/2 NS AT 75ML/HR TO RFA G20, INTACT AND FLUID INFUSING 
WELL. PT KEPT COMFORTABLE. SEIZURE PRECAUTIONS OBSERVED, PADDED RAILS. ALL NEEDS AND CARE 
ATTENDED AND PROVIDED. CALL LIGHT KEPT WITHIN REACH. PT'S BED IIN LOWEST, LOCKED, POSITION 
WITH SR X4. WILL ENDORSE TO INCOMING NIGHT NURSE FOR MIGUEL.

## 2020-05-15 NOTE — NUR
MS RN NOTES

SLEPT WELL AT NIGHT,ALERT,ORIENTED X2-3 THIS TIME.ABLE TO SAY WHERE SHE IS.IVF IN PROGRESS 
ON LFA,CALL LIGHT IN REACH,NEEDS ATTENDED.

## 2020-05-15 NOTE — NUR
MS RN NOTES 



PATIENT IN BED RESTING NO SOB OR ACUTE DISTRESS NOTED. ALL DUE MEDICATIONS ADMINISTERED. ALL 
NEEDS MET. NO ACUTE CHANGES NOTED DURING AM SHIFT. ENDORSED CARE TO PM SHIFT.

## 2020-05-15 NOTE — NUR
MS RN NOTES 



PATIENT IN BED SLEEPING  NO SOB OR ACUTE DISTRESS NOTED. BED IN LOW LOCKED POSITION. CALL 
LIGHT WITHIN REACH. WILL CONTINUE TO MONITOR.

## 2020-05-15 NOTE — NUR
MS/RN OPENING NOTES:



RECEIVED PT ON BED, A/O X2-3, ON ROOM AIR, NO SOB, ABLE TO VERBALIZED NEEDS. WITH PERIODS OF 
CONFUSION. PRESENT IVF D5 1/2 NS INFUSING WELL ON RIGHT FOREARM #20G. NEEDS FURTHER 
REORIENTATION, NOTED WITH RIGHT ABOVE THE KNEE AMPUTATION STUMP. SAFETY MEASURES IN PLACE. 
BED IN LOW, LOCKED POSITION WITH SR UP X2. CALL LIGHT IN REACH, WILL CONTINUE TO MONITOR 
ACCORDINGLY.

## 2020-05-16 NOTE — NUR
MS/RN CLOSING NOTES:



PT REMAINS ON BED, REMAINS A/O X2, ON ROOM AIR, NO SOB, ABLE TO VERBALIZED NEEDS.  NEEDS 
FURTHER REORIENTATION, HAS PERIODS OF CONFUSION. HAS IVF D5 1/2 NS INFUSING WELL ON RIGHT 
FOREARM #20G. PT. TURNED AND REPOSITIONED Q2HRS. ALL DUE MEDS GIVEN AS ORDERED. ALL NEEDS 
MET AND PROVIDED. SAFETY MEASURES KEPT IN PLACE. BED IN LOW, LOCKED POSITION. CALL LIGHT IS 
LEFT WITHIN REACH. WILL ENDORSE TO DAY SHIFT FOR MIGUEL.

## 2020-05-16 NOTE — NUR
MS/RN OPENING NOTES:



RECEIVED PT ON BED, A/O X2, ON ROOM AIR, NO SOB, ABLE TO VERBALIZED NEEDS. WITH PERIODS OF 
CONFUSION. PRESENT IVF D5 1/2 NS INFUSING WELL ON RIGHT FOREARM #20G. NEEDS FURTHER 
REORIENTATION, NOTED WITH RIGHT ABOVE THE KNEE AMPUTATION STUMP WITH HEALING SCAR TISSUE. 
SAFETY MEASURES IN PLACE. BED IN LOW, LOCKED POSITION WITH SR UP X2. CALL LIGHT IN REACH, 
WILL CONTINUE TO MONITOR ACCORDINGLY.

## 2020-05-16 NOTE — NUR
MS RN CLOSING NOTES



PATIENT IS IN BED, AWAKE AND A/O X2. BREATHING ON ROOM AIR; NO SOB NOTED AT THIS TIME. NO 
COMPLAIN OF PAIN AT THIS MOMENT. LAYING COMFORTABLY. RFA # 20  IV ACCESS IS PRESENT AND 
INTACT; FLUSHING WELL. ALL NEEDS ATTENDED TO THROUGHOUT THE DAY. SAFETY PRECAUTIONS REMAIN 
IN PLACE; BED IN LOW POSITION AND LOCKED, RAILS UP X2, CALL LIGHT WITHIN REACH. WILL ENDORSE 
TO NIGHT SHIFT NURSE.

## 2020-05-17 NOTE — NUR
MS RN CLOSING NOTES



PATIENT REMAINS IN BED, AWAKE, A/O X1. BREATHING ON ROOM AIR; NO SOB NOTED AT THIS TIME. NO 
COMPLAIN OF PAIN THROUGHOUT THE DAY. RFA # 20  IV ACCESS IS PRESENT AND INTACT; FLUSHING 
WELL. ALL NEEDS ATTENDED TO THROUGHOUT THE DAY. PATIENT IS CLEAN AND CARED FOR. SAFETY 
PRECAUTIONS REMAIN IN PLACE; BED IN LOW POSITION AND LOCKED, RAILS UP X2, CALL LIGHT WITHIN 
REACH. WILL ENDORSE TO NIGHT SHIFT NURSE.

## 2020-05-17 NOTE — NUR
MS/RN OPENING NOTES:



RECEIVED PT ON BED, A/O X2, ON ROOM AIR, NO SOB, ABLE TO VERBALIZED NEEDS. WITH PERIODS OF 
CONFUSION. PRESENT IVF D5 1/2 NS INFUSING WELL ON RIGHT FOREARM #20G. NO C/O PAIN AT THIS 
TIME. NOTED WITH RIGHT ABOVE THE KNEE AMPUTATION STUMP WITH HEALING SCAR TISSUE. SAFETY 
MEASURES IN PLACE. BED IN LOW, LOCKED POSITION WITH SR UP X2. CALL LIGHT IN REACH, WILL 
CONTINUE TO MONITOR ACCORDINGLY.

## 2020-05-17 NOTE — NUR
MS RN OPENING NOTES



PATIENT IS IN BED, AWAKE, A/O X2. BREATHING ON ROOM AIR; NO SOB NOTED AT THIS TIME. NO 
COMPLAIN OF PAIN AT THIS MOMENT. RFA # 20  IV ACCESS IS PRESENT AND INTACT; FLUSHING WELL. 
SAFETY PRECAUTIONS IN PLACE; BED IN LOW POSITION AND LOCKED, RAILS UP X2, CALL LIGHT WITHIN 
REACH. WILL CONTINUE TO MONITOR PATIENT.

## 2020-05-17 NOTE — NUR
MS/RN NOTES:



RIGHT AKA STUMP PHOTO TAKEN AND DOCUMENTED IN THE CHART. APPLIED MEPILEX AS ORDERED.

## 2020-05-17 NOTE — NUR
MS/RN CLOSING NOTES:



PT REMAINS ON BED, REMAINS A/O X2, ON ROOM AIR, NO SOB, ABLE TO VERBALIZED NEEDS.  NEEDS 
FURTHER REORIENTATION, HAS PERIODS OF CONFUSION. IVF D5 1/2 NS INFUSING WELL ON RIGHT 
FOREARM #20G. PT. TURNED AND REPOSITIONED Q2HRS. ALL DUE MEDS GIVEN AS ORDERED. ALL NEEDS 
MET AND PROVIDED. SAFETY MEASURES KEPT IN PLACE. BED IN LOW, LOCKED POSITION. CALL LIGHT IS 
LEFT WITHIN REACH. WILL ENDORSE TO DAY SHIFT FOR MIGUEL.

## 2020-05-18 NOTE — NUR
REPORT TO SNF



REPORT GIVEN TO SNF RN (ADREN). PROVIDED WITH INFO/REPORT RE: PT. ETA FOR P/U AT HOSPITAL 
WILL BE AROUND 7PM.

## 2020-05-18 NOTE — NUR
PENDING D/C



PER ADAMA JONES. PT WILL BE PICKED UP AT AROUND 7PM FOR D/C BACK TO SNF. AMBULANCE RUNNING 
LATE.

## 2020-05-18 NOTE — NUR
MS RN OPENING NOTES

PATIENT AWAKE IN BED. A/OX1-2. ON RA. NO S/S OF DISTRESS NOTED; BREATHING EVEN AND 
UNLABORED. IV PRESENT ON RIGHT FA, SIZE 20, INTACT & PATENT WITH D5 1/2 NS RUNNING AT 75 
ML/HR. CURRENTLY AWAITING FOR TRANSPORTATION FOR DISCHARGE TO [facility] 
[facility]. PER DAY SHIFT NURSE, DISCHARGE INSTRUCTIONS ARE COMPLETED AND READY; REPORT 
GIVEN TO FACILITY ALREADY. SAFETY MEASURES IN PLACE AND PATIENT'S NEEDS MET. BED LOCKED, 
ALARM ON, SIDE RAILS X2, CALL LIGHT WITHIN REACH. WILL CONTINUE TO MONITOR.

## 2020-05-18 NOTE — NUR
MS RN DISCHARGE NOTES

PATIENT DISCHARGED TO [facility] IN STABLE CONDITION. IV ON 
RIGHT FA, SIZE 20, REMAINS IN PLACE FOR CONTINUED IV ANTIBIOTIC AT SNF PER MD'S ORDERS. ONLY 
BELONGINGS NOTED WITH PATIENT ARE JEWELRY ON ON LEFT HAND; BRACELETS AND RING. VITALS -BP: 
116/69 HR: 82. DISCHARGE INSRUCTIONS/PACKED GIVEN TO EMT. TRANSPORTATION VIA [facility].

## 2021-06-26 NOTE — NUR
SALONI Rader FROM [facility] C/O SEIZURE EPISODE STARTED 0720H. EYES OPEN, 
ABLE TO RESPOND TO STIMULUS. DR BAHENA AT BEDSIDE

## 2021-06-26 NOTE — NUR
PT ARRIVED TO ICU  VIA GURNEY AT 1437. PT PLACED ON ICU MONITOR AND ASSESSED. PRESCOTT 
CATHETER BAG FULL; EMPTIED 1800ML. VENT SETTINGS AC 14/450/30% PEEP5 WITH PIP 23. PROPOFOL 
GTT AT 5 MCG/KG/MIN AND INCREASED TO 10 MCG/KG/MIN DUE TO PT BITING ETT AND EYES OPEN; PER 
DR OCASIO ORDER TO SEDATE PT AND NO EXTUBATION PLANNED FOR TODAY. ST VIA 5-LEAD MONITOR. 
VSS AND ENTERED INTO DOCUMENTATION SYSTEM. CONTINUING TO MONITOR.



HARJEET BRADFORD RN, BSN

## 2021-06-26 NOTE — NUR
PT PREP FOR INTUBATION AT THIS TIME. VITALS SIGNS /66, HR 1147, RR 18, T 
100.2, SPO2 100%.

 DR BAHENA ABND RT AT BEDSIDE, ETOMIDATE 10ML AND SUCCUNYLCHOLINE 5ML 
ADMINISTERED AT THIS TIME. WILL CONTINUE WITH PLAN OF CARE.

## 2021-06-26 NOTE — NUR
-------------------------------------------------------------------------------

             *** Note david in EDM - 06/26/21 at 0918 by CLIFFORD ***             

-------------------------------------------------------------------------------

PT INTUBATED AT THIS TIME. ETT 7.5 AND 23 AT LIP. POST INTUBATION VITALS SIGNS 
/81, , RR 18, T 100.7, SPO2 100%. WILL CONTINUE TO MONITOR

## 2021-06-26 NOTE — NUR
PATIENT TRANSPORTED TO ICU  BY NURSE, EMT AND RT WITH ACLS PROTOCOLS IN 
PLACE AT THIS TIME. REPORT CALLED IN TO SHANDA COBSY

## 2021-06-26 NOTE — NUR
PIV INSERTED IN LAC G#20 AND LEFT HAND G#20. GOOD BLOOD RETURN NOTED, INTACT 
PATENT AND FLUSHING WELL. PT TOLERATED WELL.

## 2021-06-26 NOTE — NUR
PT INTUBATED AT THIS TIME. ETT 7.5 AND 23 AT LIP. POST INTUBATION VITALS SIGNS 
/81, , RR 18, T 100.7, SPO2 100%. WILL CONTINUE TO MONITOR

## 2021-06-26 NOTE — NUR
RECEIVED PT ON BED SEDATED ON ETT 7.5/23 CM /VENT SETTING PER MD FIO2 30% SPO2 99% NO SIGN 
OF RESPIRATORY DISTRESS, NO PAIN NOTED TELE MONITOR READS SINUS RHYTHM 95, HAVE PROPOFOL @ 
25 MCG/KG/MIN INFUSING WELL VIA LEFT HAND #20, PATENT AND FLUSHED, LEFT AC# 20 PATENT AND 
FLUSHED WITH ONGOING NS TKO, BED ON LWOEST POSITION AND LOCKED SIDE RAILS UP X2 CALL LIGHT 
WITHIN REACH WILL CONT TO MONITOR

## 2021-06-26 NOTE — NUR
RT



Pt was orally intubated by Dr. Harding in ER with a 7.5 ET tube secured at 23cm at the lip 
line.  Positive CO2 color change on CO2 indicator observed, equal bilateral breath sounds 
and chest rise noted. X-ray was done and ET tube was noted to be in good position. Pt was 
placed on mechanical ventilation with noted settings by Dr. Harding. Vent is plugged into red 
outlet with BVM by bedside. MLT cuff pressure noted. No SOB or respiratory distress noted at 
this time. 

-------------------------------------------------------------------------------

Addendum: 06/26/21 at 0954 by LUC MENDEZ RT

-------------------------------------------------------------------------------

Amended: Links added.

## 2021-06-26 NOTE — NUR
REPORTED TO DR. SAGE CHAMPION ON CALL ABOUT THE PT HAVING LOW BP WITH CURRENT BP OF 83/61 HR 
90'S SHE ORDER LEVOPHED TO TITRATE PER PROTOCOL NOTED AND CARRIED OOUT


-------------------------------------------------------------------------------

Addendum: 06/26/21 at 2215 by TORRES HINTON RN

-------------------------------------------------------------------------------

MIDLINE INSERTION ALSO ORDERED NURSING SUPERVISOR MADE AWARE

## 2021-06-27 NOTE — NUR
PT ON BED STILL SEDATED ON ETT/VENT SETTING PER MD FIO2 30% STILL ON PROPOFOL @ 40MCG/KG/MIN 
AND LEVOPHED @ 0.02 MCG/KG/MIN INFUSING WELL, EPISODE OF TACHYCARDIA NOTED WITH HR UP  
SINUS TACHY ON MONITOR, DUE MEDS WAS GIVEN TURNING Q2H RENDERED WILL CONT TO MONITOR

## 2021-06-27 NOTE — NUR
RECEIVED PT ON BED AWAKE AND CALM, ABLE TO ANSWER SIMPLE  YES OR NO BY NODING ON ETT 7.5/23 
CM /VENT SETTING PER MD FIO2 30% SPO2 99% NO SIGN OF RESPIRATORY DISTRESS, NO PAIN NOTED 
TELE MONITOR READS SINUS TACHY 110 HAVE PROPOFOL @ 20 MCG/KG/MIN INFUSING WELL VIA  FADY 
PICC, LEFT HAND #20, PATENT AND FLUSHED, LEFT AC# 20 AND PATENT AND FLUSHED WITH ONGOING NS 
TKO, HAVE PRESCOTT CATHETER WITH YELLOW URINE DRAINING VIA GRAVITY BED ON LOWEST POSITION AND 
LOCKED SIDE RAILS UP X2 CALL LIGHT WITHIN REACH WILL CONT TO MONITOR

## 2021-06-27 NOTE — NUR
PT REMAINS INTUBATED AND SEDATED ON PROPOFOL 20MCG/KG/MIN. LEVO WAS TITRATED OFF AT 1000. 
RESTRAINTS REMOVED AT 0800 ASSESSMENT. PT NOT MOVING LIMBS BUT WITHDRAW SLIGHTLY/FLINCHES TO 
PAIN. LUE PICC PLACED TODAY. OGT PLACED AT 63 CM LIPS; PLACEMENT VERIFIED WITH CHARGE RN 
JUAN VIA AUSCULTATION. KCL 80 MEQ IV REPLACMENT COMPLETED PER MD ORDERS. PAIN CONTROLLED 
WITH DILAUDID IV PRN Q 4 HRS AS ORDERED. UOP REMAINS ADEQUATE. REPORTING OFF TO NOC RN.

## 2021-06-28 NOTE — NUR
NOTIFIED SAGE MORTON PT IS NPO STATUS. HOWEVER ADMISSION DX IS SEIZURE AND HAS ORDER FOR 
VIMPAT PO. PATIENT REMAINS AWAKE, ALERT, NO SIGNS OF DROWSINESS. VERIFIED WITH TRACIE CAZARES 
TO DO NURSING SWALLOW EVAL AT BEDSIDE. CHARGE NURSE AWARE.

## 2021-06-28 NOTE — NUR
RN ICU

Spoke to Dr Diamond to verify md order for swallow evaluation, swallow evaluation is to be 
done susan 6/29 because pt was just extubated today 6/28 at 1150 am.  ok to hold all po meds 
at this time per Dr Diamond and resume susan. charge nurse Kal LAND aware. 

.

## 2021-06-28 NOTE — NUR
RN NOTE



PATIENT AWAKE, ALERT AND ORIENTED X1-2. ON O2 2L VIA NASAL CANNULA, NO SIGNS OF RESPIRATORY 
DISTRESS. DENIES ANY PAIN AT THIS TIME. TELE MONITOR ON, SR 80'S. IV ACCESS ON FADY PICC, 
LEFT HAND #20 AND LEFT AC #20 PATENT AND INTACT. IVF FLUIDS RUNNING D5NS @ 125ML/HR. BED 
LOCKED AND IN LOWEST POSITION. CALL LIGHT WITHIN REACH. ALL NEEDS ANTICIPATED.

## 2021-06-28 NOTE — NUR
RN ICU

Pt bathed and cleaned.  linen change done.  skin check done with Rafita LAND, no new wounds 
noted.  pt tolerated well.  vitals stable.  safety measures in place.  will continue to 
monitor.

## 2021-06-28 NOTE — NUR
RT

PER DR SPENCER ORDER PATIENT WAS WEANED AND EXTUBATED OFF VENTILATOR. PATIENT PLACED ON 2L 
N/C WILFREDO WELL.

## 2021-06-28 NOTE — NUR
RN ICU

Bedside report given to [facility] nurse Kimball RN.  pt awake, alert and resting comfortable.  pt 
on 2L n/c tolerating well. pt clean and dry.  vitals stable.  safety measures in place.  no 
signs of acute distress at this time.

## 2021-06-28 NOTE — NUR
RN ICU

 pt extubated per md order.  pt tolerated well.  pt awake, alert and talking.  pt follows 
commands.  pt placed on 4 L n/c tolerating well.  vitals stable.  safety measures in place.  
will continue to monitor. Alert and oriented to person, place and time

## 2021-06-28 NOTE — NUR
PATIENT AWAKE AND ALERT, PASSED NURSING SWALLOW EVAL AT BEDSIDE. ADMINISTERED PO MEDS WITH 
NO DIFFICULTY SWALLOWING. CHARGE NURSE CARLOS AND SAGE AWARE. CALL LIGHT WITHIN REACH.

## 2021-06-28 NOTE — NUR
RN ICU

Propofol off, pt awake, eyes open, pt tracks, follows simple commands.  Levophed off, pt 
tolerating well.  vitals stable.  safety measures in place.  will continue to monitor.

## 2021-06-28 NOTE — NUR
RN ICU

Bedside report taken from [facility] nurse Murtaza RN.  pt sedated but awake and calm. PERRLA. pt does 
not follow commands or track.  pt moves lue rue is contracted and flacid.  Right AKA and lle 
1/5 movement.  pt intubated fio2 30 %, tolerating well.  spo2 100%.  adri lung sounds coarse 
and dimished at bases. pt has ogt clamped, bowel sounds present.  pt has avery intact and 
draining clear yellow urine.  pt skin intact, no wounds noted.  all lines traced.  all drips 
verified, kcl replacement infusing.  no signs of acute distress at this time. safety 
measures in place.  will continue to monitor.

## 2021-06-29 NOTE — NUR
Called Pharmacy, and they said that the medication should have been available but was not 
there.  Called Pharmacy to see if it was going to be available and they stated that it 
should be there,  But it was not there.  So medication was not administered.

## 2021-06-29 NOTE — NUR
PATIENT TOLERATED BED BATH WELL. ORAL CARE DONE. NOTED WITH X1 FORMED BROWN BM. TURNED AND 
REPOSITIONED. BED LOCKED AND IN LOWEST POSITION. CALL LIGHT WITHIN REACH. ALL NEEDS 
ANTICIPATED.

## 2021-06-29 NOTE — NUR
RN NOTE





PATIENT AWAKE, ALERT AND ORIENTED X1-2. ON O2 2L VIA NASAL CANNULA, NO SIGNS OF RESPIRATORY 
DISTRESS. DENIES ANY PAIN AT THIS TIME. IV ACCESS ON FADY PICC, LEFT HAND #20 AND LEFT AC #20 
PATENT AND INTACT. PRESCOTT CATH PATENT AND INTACT, OUTPUT 2000CC. NO SIGNIFICANT CHANGES 
DURING THIS SHIFT. TURNED AND REPOSITIONED. BED LOCKED AND IN LOWEST POSITION. CALL LIGHT 
WITHIN REACH. WILL ENDORSE TO AM SHIFT.

## 2021-06-29 NOTE — NUR
RN NOTE

VSS, PT DOWNGRADED TO TELE STATUS. REPORT GIVEN TO SHANDA MARTINEZ IN [facility]/TELE AND PT TRANSFERRED 
TO ROOM 116-1 VIA BED ON CARDIAC MONITOR WITHOUT COMPLICATIONS AT 1645.

## 2021-06-29 NOTE — NUR
RN Notes



PATIENT RECEIVED AT 1650 -  AWAKE, A&O X 1 , NC WAS  D/C REMAINING BETWEEN % ROOM AIR 
- MONITORING  OXYGEN STABLE, NO SIGNS OF RESPIRATORY DISTRESS. DENIES ANY PAIN AT THIS TIME 
ABLE TO NOD HEAD AND BLINK EYES TO COMMUNICATE AND GENTLY WHISPER YES OR NO ,  IV ACCESS ON 
FADY PICC, LEFT HAND #20 AND LEFT AC #20 PATENT AND INTACT. PRESCOTT CATH PATENT AND INTACT, 
OUTPUT 100 CC  NO SIGNIFICANT CHANGES DURING THIS SHIFT. TURNED AND REPOSITIONED. BED LOCKED 
AND IN LOWEST POSITION. CALL LIGHT WITHIN REACH. DECIDED NOT TO EAT DINNER, OFFERED OTHER 
OPTIONS HAS REFUSED TO EAT DINNER AT THIS TIME, CALL LIGHT IN REACH AND WILL MONITOR CLOSELY 
AT THIS TIME.

## 2021-06-30 NOTE — NUR
RN NOTES 

PT STILL REFUSES MEDS AND PO INTAKE , ON TELE SB-SR, NO SIGNIFICANT CHANGES NOTED ON THIS 
SHIFT , WILL ENDORSE TO NIGHT SHIFT NURSE FOR CONTINUITY OF CARE .

## 2021-06-30 NOTE — NUR
RN NOTES 

RECEIVED PT ON BED, ALERT/ NONVERBAL , DOES NOT FOLLOW COMMAND, ON 2L 02 N/C , O2 SAT WNL,  
ON TELE SB , PRESCOTT DRAINING TO GRAVITY, LEFT UPPER ARM PICC LINE SITE CLEAN ,DRY AND INTACT, 
SR UP x3, CALL LIGHT WITHIN EASY REACH, BED LOCKED AND IN LOWEST POSITION, CONTINUE TO 
MONITOR.

## 2021-06-30 NOTE — NUR
TELE/RN NOTE



ORDER CURRENTLY IN PLACE FOR IV NS 0.9% @ 250ML/HR X 2L FROM 6/27 NOT INITIATED YET. 
NOTIFIED ON CALL MILTON WALKER WHO STATES TO FOLLOW UP IN AM. WILL CONTINUE TO MONITOR.

## 2021-06-30 NOTE — NUR
TELE/RN NOTE



PATIENT HAS HISTORY OF DM TYPE 2. SPOKE WITH ON CALL NP DENISE WITH NEW ORDERS FOR 
ACCUCHECKS ACHS AND MILD INSULIN SS. ORDERS INPUTTED AND CARRIED OUT. WILL CONTINUE TO 
MONITOR.

## 2021-06-30 NOTE — NUR
TELE/RN OPENING NOTE



RECEIVED PATIENT RESTING IN BED. ALERT AND ORIENTED X 1-2 AT BASELINE. NO S/SX OF PAIN NOTED 
AT THIS TIME. IV  TO LEFT UPPER ARM PICC, LEFT HAND #20G AND LEFT AC #20G  INTACT, PATENT 
AND SALINE LOCKED. CONTINUES ON IV ABX. CONTINUES ON PUREE DIET WITH NO S/SX OF ASPIRATION 
NOTED. CALL LIGHT WITHIN REACH. ASPIRATION, FALL AND SAFETY PRECAUTIONS MAINTAINED. WILL 
CONTINUE TO MONITOR.

## 2021-06-30 NOTE — NUR
RN NOTES 

PT REFUSES PO INTAKE , PT KEEPS MEDS IN HER MOUTH AND SPITS THEM UP. DR FLORES NOITFED,  KCL IV 
ORDERED. SPOKEN TO PT'S SISTER REGARDING PEG PLACEMENT PER DR FLORES REQUEST, FAMILY REFUSED 
PEG PLACEMENT AT THIS TIME .

## 2021-07-01 NOTE — NUR
TELE/RN NOTE



LAB CALLED REPORTING POTASSIUM VALUE OF 2.7 THIS MORNING. NOTIFIED ON CALL NP DENISE VICKERS - 
AWAITING ORDERS.

## 2021-07-01 NOTE — NUR
patient discharged from hospital in stable condition. patient discharge paperwork provided 
for client prior to discharge. [facility] to randy nurse[facility] care 
transferred over to [facility] ambulance for transportation.

## 2025-07-16 NOTE — NUR
MS/RN CLOSING NOTES:



PT REMAINS ON BED, REMAINS A/O X2, ON ROOM AIR, NO SOB, ABLE TO VERBALIZED NEEDS.  NEEDS 
FURTHER REORIENTATION, HAS PERIODS OF CONFUSION. IVF D5 1/2 NS INFUSING WELL ON RIGHT 
FOREARM #20G. PT. TURNED AND REPOSITIONED Q2HRS. ALL DUE MEDS GIVEN AS ORDERED. ALL NEEDS 
MET AND PROVIDED. SAFETY MEASURES KEPT IN PLACE. BED IN LOW, LOCKED POSITION. CALL LIGHT IS 
LEFT WITHIN REACH. WILL ENDORSE TO DAY SHIFT FOR MIGUEL. No difficulties